# Patient Record
Sex: FEMALE | Race: ASIAN | NOT HISPANIC OR LATINO | URBAN - METROPOLITAN AREA
[De-identification: names, ages, dates, MRNs, and addresses within clinical notes are randomized per-mention and may not be internally consistent; named-entity substitution may affect disease eponyms.]

---

## 2020-09-21 ENCOUNTER — LAB OUTSIDE AN ENCOUNTER (OUTPATIENT)
Dept: URBAN - METROPOLITAN AREA CLINIC 98 | Facility: CLINIC | Age: 30
End: 2020-09-21

## 2020-09-22 LAB
ALBUMIN/GLOBULIN RATIO: 1.3
ALBUMIN: 4.6
ALKALINE PHOSPHATASE: 67
ALT (SGPT): 11
AST (SGOT): 17
BILIRUBIN, DIRECT: 0.2
BILIRUBIN, INDIRECT: 1.1
BILIRUBIN, TOTAL: 1.3
CHOL/HDLC RATIO: 3.9
CHOLESTEROL, TOTAL: 185
GLOBULIN: 3.5
HDL CHOLESTEROL: 48
IMMUNOGLOBULIN A: 388
IMMUNOGLOBULIN G: 2279
IMMUNOGLOBULIN M: 152
LDL-CHOLESTEROL: 120
NON HDL CHOLESTEROL: 137
PROTEIN, TOTAL: 8.1
TRIGLYCERIDES: 71

## 2020-09-29 ENCOUNTER — OFFICE VISIT (OUTPATIENT)
Dept: URBAN - METROPOLITAN AREA TELEHEALTH 2 | Facility: TELEHEALTH | Age: 30
End: 2020-09-29
Payer: COMMERCIAL

## 2020-09-29 DIAGNOSIS — D47.2: ICD-10-CM

## 2020-09-29 DIAGNOSIS — K76.0 FATTY LIVER: ICD-10-CM

## 2020-09-29 DIAGNOSIS — E78.5 DYSLIPIDEMIA: ICD-10-CM

## 2020-09-29 PROCEDURE — 1036F TOBACCO NON-USER: CPT | Performed by: INTERNAL MEDICINE

## 2020-09-29 PROCEDURE — 99213 OFFICE O/P EST LOW 20 MIN: CPT | Performed by: INTERNAL MEDICINE

## 2020-09-29 PROCEDURE — G9903 PT SCRN TBCO ID AS NON USER: HCPCS | Performed by: INTERNAL MEDICINE

## 2020-09-29 PROCEDURE — G8420 CALC BMI NORM PARAMETERS: HCPCS | Performed by: INTERNAL MEDICINE

## 2020-09-29 PROCEDURE — G8427 DOCREV CUR MEDS BY ELIG CLIN: HCPCS | Performed by: INTERNAL MEDICINE

## 2020-09-29 RX ORDER — DROSPIRENONE/ETHINYL ESTRADIOL/LEVOMEFOLATE CALCIUM AND LEVOMEFOLATE CALCIUM 3-0.02(24)
TAKE 1 TABLET BY ORAL ROUTE ONCE DAILY KIT ORAL 1
Qty: 0 | Refills: 0 | Status: ACTIVE | COMMUNITY
Start: 1900-01-01

## 2020-09-29 NOTE — HPI-TODAY'S VISIT:
Admits that she was slacking off her regimen at the beginning of quarantine. now back to normal.  eating a healthy diet.  exercising regularly.  no abdominal pain

## 2021-07-20 ENCOUNTER — TELEPHONE ENCOUNTER (OUTPATIENT)
Dept: URBAN - METROPOLITAN AREA CLINIC 98 | Facility: CLINIC | Age: 31
End: 2021-07-20

## 2021-09-07 ENCOUNTER — OFFICE VISIT (OUTPATIENT)
Dept: URBAN - METROPOLITAN AREA CLINIC 97 | Facility: CLINIC | Age: 31
End: 2021-09-07
Payer: COMMERCIAL

## 2021-09-07 DIAGNOSIS — K76.0 FATTY (CHANGE OF) LIVER: ICD-10-CM

## 2021-09-07 PROCEDURE — 76700 US EXAM ABDOM COMPLETE: CPT | Performed by: INTERNAL MEDICINE

## 2021-09-07 RX ORDER — DROSPIRENONE/ETHINYL ESTRADIOL/LEVOMEFOLATE CALCIUM AND LEVOMEFOLATE CALCIUM 3-0.02(24)
TAKE 1 TABLET BY ORAL ROUTE ONCE DAILY KIT ORAL 1
Qty: 0 | Refills: 0 | Status: ACTIVE | COMMUNITY
Start: 1900-01-01

## 2021-09-09 LAB
A/G RATIO: 1.3
ALBUMIN: 4.5
ALKALINE PHOSPHATASE: 43
ALT (SGPT): 110
AST (SGOT): 62
BASO (ABSOLUTE): 0
BASOS: 0
BILIRUBIN, TOTAL: 0.5
BUN/CREATININE RATIO: 14
BUN: 10
CALCIUM: 9.4
CARBON DIOXIDE, TOTAL: 21
CHLORIDE: 104
CHOLESTEROL, TOTAL: 198
COMMENT:: (no result)
CREATININE: 0.69
EGFR IF AFRICN AM: 135
EGFR IF NONAFRICN AM: 117
EOS (ABSOLUTE): 0
EOS: 1
GLOBULIN, TOTAL: 3.4
GLUCOSE: 86
HDL CHOLESTEROL: 56
HEMATOCRIT: 39.7
HEMATOLOGY COMMENTS:: (no result)
HEMOGLOBIN: 13.1
IGG, SUBCLASS 4: 441
IMMATURE CELLS: (no result)
IMMATURE GRANS (ABS): 0
IMMATURE GRANULOCYTES: 0
IMMUNOGLOBULIN A, QN, SERUM: 313
IMMUNOGLOBULIN G, QN, SERUM: 1983
IMMUNOGLOBULIN M, QN, SERUM: 115
LDL CHOL CALC (NIH): 116
LYMPHS (ABSOLUTE): 2.1
LYMPHS: 36
MCH: 30.4
MCHC: 33
MCV: 92
MONOCYTES(ABSOLUTE): 0.4
MONOCYTES: 6
NEUTROPHILS (ABSOLUTE): 3.3
NEUTROPHILS: 57
NRBC: (no result)
PLATELETS: 336
POTASSIUM: 4.1
PROTEIN, TOTAL: 7.9
RBC: 4.31
RDW: 11.6
SODIUM: 139
TRIGLYCERIDES: 150
VLDL CHOLESTEROL CAL: 26
WBC: 5.8

## 2021-09-14 ENCOUNTER — OFFICE VISIT (OUTPATIENT)
Dept: URBAN - METROPOLITAN AREA CLINIC 97 | Facility: CLINIC | Age: 31
End: 2021-09-14

## 2021-09-16 ENCOUNTER — OFFICE VISIT (OUTPATIENT)
Dept: URBAN - METROPOLITAN AREA TELEHEALTH 2 | Facility: TELEHEALTH | Age: 31
End: 2021-09-16
Payer: COMMERCIAL

## 2021-09-16 DIAGNOSIS — R74.01 TRANSAMINITIS: ICD-10-CM

## 2021-09-16 DIAGNOSIS — D47.2: ICD-10-CM

## 2021-09-16 DIAGNOSIS — K76.0 FATTY LIVER: ICD-10-CM

## 2021-09-16 DIAGNOSIS — E78.5 DYSLIPIDEMIA: ICD-10-CM

## 2021-09-16 PROCEDURE — 99214 OFFICE O/P EST MOD 30 MIN: CPT | Performed by: INTERNAL MEDICINE

## 2021-09-16 RX ORDER — DROSPIRENONE/ETHINYL ESTRADIOL/LEVOMEFOLATE CALCIUM AND LEVOMEFOLATE CALCIUM 3-0.02(24)
TAKE 1 TABLET BY ORAL ROUTE ONCE DAILY KIT ORAL 1
Qty: 0 | Refills: 0 | Status: ACTIVE | COMMUNITY
Start: 1900-01-01

## 2021-09-16 NOTE — HPI-TODAY'S VISIT:
I've been feeling ok.  after last year - did relax diet.  not as strict about her diet and exercise.  did change OCP - now on Reclipsin - she wanted a change bc of hair loss.  nov 2020  no vitamins / supplements except took a vaginal probiotic over the last year [facility] . labs for physical : august alt 71 ast 38  . no alcohol use.  . US 9/7/2021 : fatty liver .

## 2021-09-19 ENCOUNTER — TELEPHONE ENCOUNTER (OUTPATIENT)
Dept: URBAN - METROPOLITAN AREA CLINIC 98 | Facility: CLINIC | Age: 31
End: 2021-09-19

## 2021-09-19 PROBLEM — 370992007 DYSLIPIDEMIA: Status: ACTIVE | Noted: 2020-09-29

## 2021-09-29 ENCOUNTER — LAB OUTSIDE AN ENCOUNTER (OUTPATIENT)
Dept: URBAN - METROPOLITAN AREA TELEHEALTH 2 | Facility: TELEHEALTH | Age: 31
End: 2021-09-29

## 2021-11-01 ENCOUNTER — WEB ENCOUNTER (OUTPATIENT)
Dept: URBAN - METROPOLITAN AREA CLINIC 98 | Facility: CLINIC | Age: 31
End: 2021-11-01

## 2021-11-19 LAB
A/G RATIO: 1.1
ALBUMIN: 4.4
ALKALINE PHOSPHATASE: 48
ALT (SGPT): 79
AST (SGOT): 44
BASO (ABSOLUTE): 0
BASOS: 1
BILIRUBIN, TOTAL: 0.6
BUN/CREATININE RATIO: 13
BUN: 9
CALCIUM: 9.3
CARBON DIOXIDE, TOTAL: 22
CHLORIDE: 101
CREATININE: 0.69
EGFR IF AFRICN AM: 135
EGFR IF NONAFRICN AM: 117
EOS (ABSOLUTE): 0.1
EOS: 1
GGT: 12
GLOBULIN, TOTAL: 3.9
GLUCOSE: 83
HEMATOCRIT: 41.2
HEMATOLOGY COMMENTS:: (no result)
HEMOGLOBIN: 13.9
IMMATURE CELLS: (no result)
IMMATURE GRANS (ABS): 0
IMMATURE GRANULOCYTES: 0
IMMUNOGLOBULIN A, QN, SERUM: 366
IMMUNOGLOBULIN G, QN, SERUM: 2316
IMMUNOGLOBULIN M, QN, SERUM: 127
LYMPHS (ABSOLUTE): 2.2
LYMPHS: 41
MCH: 31.2
MCHC: 33.7
MCV: 93
MONOCYTES(ABSOLUTE): 0.3
MONOCYTES: 6
NEUTROPHILS (ABSOLUTE): 2.8
NEUTROPHILS: 51
NRBC: (no result)
PLATELETS: 369
POTASSIUM: 4.2
PROTEIN, TOTAL: 8.3
RBC: 4.45
RDW: 11.6
SODIUM: 137
WBC: 5.4

## 2021-11-21 ENCOUNTER — TELEPHONE ENCOUNTER (OUTPATIENT)
Dept: URBAN - METROPOLITAN AREA CLINIC 98 | Facility: CLINIC | Age: 31
End: 2021-11-21

## 2022-01-14 ENCOUNTER — LAB OUTSIDE AN ENCOUNTER (OUTPATIENT)
Dept: URBAN - METROPOLITAN AREA CLINIC 98 | Facility: CLINIC | Age: 32
End: 2022-01-14

## 2022-01-14 LAB
ABSOLUTE BASOPHIL COUNT: 0.03
ABSOLUTE EOSINOPHIL COUNT: 0.03
ABSOLUTE IMMATURE GRANULOCYTE  COUNT: 0.02
ABSOLUTE LYMPHOCYTE COUNT: 2.18
ABSOLUTE MONOCYTE COUNT: 0.32
ABSOLUTE NEUTROPHIL COUNT (ANC): 4.06
ABSOLUTE NRBC  COUNT: 0
BASOPHIL AUTO: 0
EOS AUTO: 0
HCT: 43.2
HGB: 14.5
IMMATURE GRANULOCYTES AUTO: 0.3
INR: 1.1
LYMPH AUTO: 33
MCH: 30.3
MCHC: 33.6
MCV: 90.4
MONO AUTO: 5
MPV: 9.9
NEUTRO AUTO: 61
NRBC AUTO: 0
PARTIAL THROMBOPLASTIN TIME: 29.3
PERFORMING LAB: (no result)
PLATELETS: 323
PT: 13.3
RBC: 4.78
RDW: 11.2
WBC: 6.6

## 2022-01-19 ENCOUNTER — TELEPHONE ENCOUNTER (OUTPATIENT)
Dept: URBAN - METROPOLITAN AREA CLINIC 98 | Facility: CLINIC | Age: 32
End: 2022-01-19

## 2022-01-19 ENCOUNTER — WEB ENCOUNTER (OUTPATIENT)
Dept: URBAN - METROPOLITAN AREA CLINIC 98 | Facility: CLINIC | Age: 32
End: 2022-01-19

## 2022-01-19 NOTE — HPI-TODAY'S VISIT:
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   - NO STEATOHEPATITIS.   - NO SIGNIFICANT LOBULAR INFLAMMATION OR PORTAL TRIADITIS.   - NO FIBROSIS.

## 2022-01-20 ENCOUNTER — TELEPHONE ENCOUNTER (OUTPATIENT)
Dept: URBAN - METROPOLITAN AREA CLINIC 98 | Facility: CLINIC | Age: 32
End: 2022-01-20

## 2022-01-20 ENCOUNTER — OFFICE VISIT (OUTPATIENT)
Dept: URBAN - METROPOLITAN AREA TELEHEALTH 2 | Facility: TELEHEALTH | Age: 32
End: 2022-01-20
Payer: COMMERCIAL

## 2022-01-20 DIAGNOSIS — R21 RASH: ICD-10-CM

## 2022-01-20 DIAGNOSIS — K76.0 FATTY LIVER DETERMINED BY BIOPSY: ICD-10-CM

## 2022-01-20 PROCEDURE — 99214 OFFICE O/P EST MOD 30 MIN: CPT | Performed by: INTERNAL MEDICINE

## 2022-01-20 RX ORDER — CEPHALEXIN 250 MG/1
1 CAPSULE CAPSULE ORAL
Qty: 20 | OUTPATIENT
Start: 2022-01-20 | End: 2022-01-25

## 2022-01-20 RX ORDER — DROSPIRENONE/ETHINYL ESTRADIOL/LEVOMEFOLATE CALCIUM AND LEVOMEFOLATE CALCIUM 3-0.02(24)
TAKE 1 TABLET BY ORAL ROUTE ONCE DAILY KIT ORAL 1
Qty: 0 | Refills: 0 | Status: ACTIVE | COMMUNITY
Start: 1900-01-01

## 2022-01-20 NOTE — PHYSICAL EXAM GASTROINTESTINAL
Abdomen , Self exam :soft, nontender, nondistended , no guarding.  2x2 cm erythematous region in epigastric region - non tender to self exam but "full" by her description

## 2022-01-20 NOTE — HPI-TODAY'S VISIT:
Here to follow up from liver biopsy last week.  After biopsy - had rash develop over site, which is a little bit swollen she says.  She tried topical muciprocin and then yesterday IR provided her medrol dose pack - rash has not enlarged  and hasn't changed since onset.  otherwise doing well

## 2022-07-11 LAB
A/G RATIO: 1.1
ALBUMIN: 4.9
ALKALINE PHOSPHATASE: 45
ALT (SGPT): 258
AST (SGOT): 132
BASO (ABSOLUTE): 0
BASOS: 1
BILIRUBIN, TOTAL: 0.8
BUN/CREATININE RATIO: 19
BUN: 15
CALCIUM: 9.6
CARBON DIOXIDE, TOTAL: 21
CHLORIDE: 102
CREATININE: 0.8
EGFR: 101
EOS (ABSOLUTE): 0.1
EOS: 1
GGT: 18
GLOBULIN, TOTAL: 4.3
GLUCOSE: 98
HEMATOCRIT: 44.4
HEMATOLOGY COMMENTS:: (no result)
HEMOGLOBIN: 14.6
IMMATURE CELLS: (no result)
IMMATURE GRANS (ABS): 0
IMMATURE GRANULOCYTES: 0
IMMUNOGLOBULIN A, QN, SERUM: 349
IMMUNOGLOBULIN G, QN, SERUM: 2392
IMMUNOGLOBULIN M, QN, SERUM: 121
LYMPHS (ABSOLUTE): 1.6
LYMPHS: 25
MCH: 30.2
MCHC: 32.9
MCV: 92
MONOCYTES(ABSOLUTE): 0.5
MONOCYTES: 7
NEUTROPHILS (ABSOLUTE): 4.2
NEUTROPHILS: 66
NRBC: (no result)
PLATELETS: 339
POTASSIUM: 4
PROTEIN, TOTAL: 9.2
RBC: 4.83
RDW: 11.8
SODIUM: 136
WBC: 6.4

## 2022-07-15 ENCOUNTER — TELEPHONE ENCOUNTER (OUTPATIENT)
Dept: URBAN - METROPOLITAN AREA CLINIC 98 | Facility: CLINIC | Age: 32
End: 2022-07-15

## 2022-07-20 ENCOUNTER — LAB OUTSIDE AN ENCOUNTER (OUTPATIENT)
Dept: URBAN - METROPOLITAN AREA TELEHEALTH 2 | Facility: TELEHEALTH | Age: 32
End: 2022-07-20

## 2022-07-23 ENCOUNTER — WEB ENCOUNTER (OUTPATIENT)
Dept: URBAN - METROPOLITAN AREA TELEHEALTH 2 | Facility: TELEHEALTH | Age: 32
End: 2022-07-23

## 2022-07-28 ENCOUNTER — OFFICE VISIT (OUTPATIENT)
Dept: URBAN - METROPOLITAN AREA TELEHEALTH 2 | Facility: TELEHEALTH | Age: 32
End: 2022-07-28
Payer: COMMERCIAL

## 2022-07-28 VITALS — HEIGHT: 67 IN | WEIGHT: 125 LBS | BODY MASS INDEX: 19.62 KG/M2

## 2022-07-28 DIAGNOSIS — K76.0 FATTY LIVER DETERMINED BY BIOPSY: ICD-10-CM

## 2022-07-28 PROCEDURE — 99214 OFFICE O/P EST MOD 30 MIN: CPT | Performed by: INTERNAL MEDICINE

## 2022-07-28 RX ORDER — DROSPIRENONE/ETHINYL ESTRADIOL/LEVOMEFOLATE CALCIUM AND LEVOMEFOLATE CALCIUM 3-0.02(24)
TAKE 1 TABLET BY ORAL ROUTE ONCE DAILY KIT ORAL 1
Qty: 0 | Refills: 0 | Status: ACTIVE | COMMUNITY
Start: 1900-01-01

## 2022-07-28 NOTE — PHYSICAL EXAM NEUROLOGIC:
oriented to person, place and time ; short and long term memory intact , oriented to person, place and time ; short and long term memory intact

## 2022-07-28 NOTE — HPI-TODAY'S VISIT:
reports that labs march alt 42 ast 30 - so now with acute rise in transaminitis she says that she moved - so she has been having less exercise : more sedentary  and less restrictions on her diet.  did see Dermatology :  did started spironolactone for hair loss denies other medications or supplements  otherwise feels well no rash

## 2022-07-28 NOTE — PHYSICAL EXAM GASTROINTESTINAL
Abdomen , Self exam :soft, nontender, nondistended , no guarding , Abdomen , Self exam :soft, nontender, nondistended , no guarding

## 2022-07-28 NOTE — PHYSICAL EXAM HENT:
Head,  normocephalic,  atraumatic,  Face,  Face within normal limits,  Ears,  External ears within normal limits,  Nose/Nasopharynx,  External nose  normal appearance,  nares patent,  no nasal discharge,  Mouth and Throat,  Oral cavity appearance normal Lips,  Appearance normal , Head,  normocephalic,  atraumatic,  Face,  Face within normal limits,  Ears,  External ears within normal limits,  Nose/Nasopharynx,  External nose  normal appearance,  nares patent,  no nasal discharge,  Mouth and Throat,  Oral cavity appearance normal Lips,  Appearance normal

## 2022-08-04 ENCOUNTER — WEB ENCOUNTER (OUTPATIENT)
Dept: URBAN - METROPOLITAN AREA CLINIC 98 | Facility: CLINIC | Age: 32
End: 2022-08-04

## 2022-08-10 ENCOUNTER — WEB ENCOUNTER (OUTPATIENT)
Dept: URBAN - METROPOLITAN AREA CLINIC 98 | Facility: CLINIC | Age: 32
End: 2022-08-10

## 2022-08-11 ENCOUNTER — WEB ENCOUNTER (OUTPATIENT)
Dept: URBAN - METROPOLITAN AREA CLINIC 98 | Facility: CLINIC | Age: 32
End: 2022-08-11

## 2022-10-25 ENCOUNTER — WEB ENCOUNTER (OUTPATIENT)
Dept: URBAN - METROPOLITAN AREA CLINIC 98 | Facility: CLINIC | Age: 32
End: 2022-10-25

## 2022-11-05 LAB
ALBUMIN/GLOBULIN RATIO: 1
ALBUMIN: 4.3
ALKALINE PHOSPHATASE: 32
ALT (SGPT): 144
AST (SGOT): 52
BILIRUBIN, DIRECT: 0.2
BILIRUBIN, INDIRECT: 0.6
BILIRUBIN, TOTAL: 0.8
GLOBULIN: 4.1
PROTEIN, TOTAL: 8.4

## 2022-11-06 ENCOUNTER — TELEPHONE ENCOUNTER (OUTPATIENT)
Dept: URBAN - METROPOLITAN AREA CLINIC 98 | Facility: CLINIC | Age: 32
End: 2022-11-06

## 2022-11-06 PROBLEM — 197321007: Status: ACTIVE | Noted: 2022-01-20

## 2022-11-06 PROBLEM — 267440005 MONOCLONAL PARAPROTEINEMIA: Status: ACTIVE | Noted: 2020-09-29

## 2022-11-07 ENCOUNTER — WEB ENCOUNTER (OUTPATIENT)
Dept: URBAN - METROPOLITAN AREA CLINIC 98 | Facility: CLINIC | Age: 32
End: 2022-11-07

## 2022-11-11 ENCOUNTER — LAB OUTSIDE AN ENCOUNTER (OUTPATIENT)
Dept: URBAN - METROPOLITAN AREA CLINIC 98 | Facility: CLINIC | Age: 32
End: 2022-11-11

## 2023-02-06 ENCOUNTER — LAB OUTSIDE AN ENCOUNTER (OUTPATIENT)
Dept: URBAN - METROPOLITAN AREA CLINIC 98 | Facility: CLINIC | Age: 33
End: 2023-02-06

## 2023-03-13 ENCOUNTER — LAB OUTSIDE AN ENCOUNTER (OUTPATIENT)
Dept: URBAN - METROPOLITAN AREA CLINIC 98 | Facility: CLINIC | Age: 33
End: 2023-03-13

## 2023-03-15 LAB
A/G RATIO: 1.2
ALBUMIN: 4.4
ALKALINE PHOSPHATASE: 44
ALT (SGPT): 30
AST (SGOT): 19
BASO (ABSOLUTE): 0
BASOS: 1
BILIRUBIN, TOTAL: 0.6
BUN/CREATININE RATIO: 12
BUN: 8
CALCIUM: 9.5
CARBON DIOXIDE, TOTAL: 22
CHLORIDE: 104
CREATININE: 0.69
EGFR: 119
EOS (ABSOLUTE): 0.1
EOS: 2
GLOBULIN, TOTAL: 3.8
GLUCOSE: 95
HEMATOCRIT: 39.4
HEMATOLOGY COMMENTS:: (no result)
HEMOGLOBIN: 13.1
IMMATURE CELLS: (no result)
IMMATURE GRANS (ABS): 0
IMMATURE GRANULOCYTES: 0
IMMUNOGLOBULIN A, QN, SERUM: 333
IMMUNOGLOBULIN E, TOTAL: 28
IMMUNOGLOBULIN G, QN, SERUM: 2200
IMMUNOGLOBULIN M, QN, SERUM: 132
LYMPHS (ABSOLUTE): 2.1
LYMPHS: 37
MCH: 30.8
MCHC: 33.2
MCV: 93
MONOCYTES(ABSOLUTE): 0.3
MONOCYTES: 6
NEUTROPHILS (ABSOLUTE): 3.1
NEUTROPHILS: 54
NRBC: (no result)
PLATELETS: 362
POTASSIUM: 3.8
PROTEIN, TOTAL: 8.2
RBC: 4.25
RDW: 11.8
SODIUM: 138
WBC: 5.7

## 2023-03-21 ENCOUNTER — WEB ENCOUNTER (OUTPATIENT)
Dept: URBAN - METROPOLITAN AREA CLINIC 98 | Facility: CLINIC | Age: 33
End: 2023-03-21

## 2023-03-24 ENCOUNTER — OFFICE VISIT (OUTPATIENT)
Dept: URBAN - METROPOLITAN AREA TELEHEALTH 2 | Facility: TELEHEALTH | Age: 33
End: 2023-03-24
Payer: COMMERCIAL

## 2023-03-24 ENCOUNTER — TELEPHONE ENCOUNTER (OUTPATIENT)
Dept: URBAN - METROPOLITAN AREA CLINIC 35 | Facility: CLINIC | Age: 33
End: 2023-03-24

## 2023-03-24 DIAGNOSIS — K76.0 FATTY LIVER DETERMINED BY BIOPSY: ICD-10-CM

## 2023-03-24 PROCEDURE — 99213 OFFICE O/P EST LOW 20 MIN: CPT | Performed by: INTERNAL MEDICINE

## 2023-03-24 RX ORDER — DROSPIRENONE/ETHINYL ESTRADIOL/LEVOMEFOLATE CALCIUM AND LEVOMEFOLATE CALCIUM 3-0.02(24)
TAKE 1 TABLET BY ORAL ROUTE ONCE DAILY KIT ORAL 1
Qty: 0 | Refills: 0 | Status: ACTIVE | COMMUNITY
Start: 1900-01-01

## 2023-03-24 NOTE — HPI-TODAY'S VISIT:
in Feb, went through egg retrieval  she thinks she might have PCOS  estrogen was very high  many eggs were retrieved.  15 embryo  . now back to normal.  got menses 15d after retrieval  back on OCP and spironolactone (off 12/24) . was on Coq10, acai velazquez feb - mar also was on levothyroxine

## 2023-06-24 ENCOUNTER — LAB OUTSIDE AN ENCOUNTER (OUTPATIENT)
Dept: URBAN - METROPOLITAN AREA TELEHEALTH 2 | Facility: TELEHEALTH | Age: 33
End: 2023-06-24

## 2023-08-07 ENCOUNTER — WEB ENCOUNTER (OUTPATIENT)
Dept: URBAN - METROPOLITAN AREA CLINIC 98 | Facility: CLINIC | Age: 33
End: 2023-08-07

## 2024-01-30 LAB
A/G RATIO: 1.1
ALBUMIN: 4.2
ALKALINE PHOSPHATASE: 35
ALT (SGPT): 35
AST (SGOT): 24
BASO (ABSOLUTE): 0
BASOS: 1
BILIRUBIN, TOTAL: 0.6
BUN/CREATININE RATIO: 22
BUN: 16
CALCIUM: 9.5
CARBON DIOXIDE, TOTAL: 21
CHLORIDE: 102
CREATININE: 0.74
EGFR: 110
EOS (ABSOLUTE): 0
EOS: 1
GGT: 4
GLOBULIN, TOTAL: 3.9
GLUCOSE: 90
HEMATOCRIT: 38.4
HEMATOLOGY COMMENTS:: (no result)
HEMOGLOBIN: 13
IMMATURE CELLS: (no result)
IMMATURE GRANS (ABS): 0
IMMATURE GRANULOCYTES: 0
LYMPHS (ABSOLUTE): 1.7
LYMPHS: 34
MCH: 31.6
MCHC: 33.9
MCV: 93
MONOCYTES(ABSOLUTE): 0.3
MONOCYTES: 6
NEUTROPHILS (ABSOLUTE): 2.9
NEUTROPHILS: 58
NRBC: (no result)
PLATELETS: 337
POTASSIUM: 4
PROTEIN, TOTAL: 8.1
RBC: 4.12
RDW: 11.9
SODIUM: 137
WBC: 5

## 2024-02-02 ENCOUNTER — OV EP (OUTPATIENT)
Dept: URBAN - METROPOLITAN AREA CLINIC 98 | Facility: CLINIC | Age: 34
End: 2024-02-02
Payer: COMMERCIAL

## 2024-02-02 VITALS
BODY MASS INDEX: 18.87 KG/M2 | HEIGHT: 67 IN | TEMPERATURE: 96.6 F | SYSTOLIC BLOOD PRESSURE: 91 MMHG | WEIGHT: 120.2 LBS | HEART RATE: 81 BPM | DIASTOLIC BLOOD PRESSURE: 68 MMHG

## 2024-02-02 DIAGNOSIS — K76.0 FATTY LIVER DETERMINED BY BIOPSY: ICD-10-CM

## 2024-02-02 DIAGNOSIS — R74.8 ELEVATED LIVER ENZYMES: ICD-10-CM

## 2024-02-02 PROCEDURE — 99213 OFFICE O/P EST LOW 20 MIN: CPT | Performed by: INTERNAL MEDICINE

## 2024-02-02 RX ORDER — LEVOTHYROXINE SODIUM 50 UG/1
1 CAPSULE IN THE MORNING ON AN EMPTY STOMACH CAPSULE ORAL ONCE A DAY
Status: ACTIVE | COMMUNITY

## 2024-02-02 RX ORDER — SPIRONOLACTONE 100 MG/1
1 TABLET TABLET, FILM COATED ORAL ONCE A DAY
Status: ACTIVE | COMMUNITY

## 2024-02-02 RX ORDER — DROSPIRENONE/ETHINYL ESTRADIOL/LEVOMEFOLATE CALCIUM AND LEVOMEFOLATE CALCIUM 3-0.02(24)
TAKE 1 TABLET BY ORAL ROUTE ONCE DAILY KIT ORAL 1
Qty: 0 | Refills: 0 | Status: ACTIVE | COMMUNITY
Start: 1900-01-01

## 2024-02-02 NOTE — HPI-TODAY'S VISIT:
Travelled to [facility]  started working out more regularly -treadmill/ day  no new medications but she is on vitamin D and Biotin from [employer]

## 2024-02-07 ENCOUNTER — LAB (OUTPATIENT)
Dept: URBAN - METROPOLITAN AREA CLINIC 98 | Facility: CLINIC | Age: 34
End: 2024-02-07

## 2025-02-02 ENCOUNTER — LAB OUTSIDE AN ENCOUNTER (OUTPATIENT)
Dept: URBAN - METROPOLITAN AREA CLINIC 98 | Facility: CLINIC | Age: 35
End: 2025-02-02

## 2025-02-11 ENCOUNTER — OFFICE VISIT (OUTPATIENT)
Dept: URBAN - METROPOLITAN AREA CLINIC 98 | Facility: CLINIC | Age: 35
End: 2025-02-11

## 2025-03-21 ENCOUNTER — DASHBOARD ENCOUNTERS (OUTPATIENT)
Age: 35
End: 2025-03-21

## 2025-03-21 ENCOUNTER — OFFICE VISIT (OUTPATIENT)
Dept: URBAN - METROPOLITAN AREA CLINIC 98 | Facility: CLINIC | Age: 35
End: 2025-03-21
Payer: COMMERCIAL

## 2025-03-21 DIAGNOSIS — R74.8 ELEVATED LIVER ENZYMES: ICD-10-CM

## 2025-03-21 DIAGNOSIS — K76.0 FATTY LIVER DETERMINED BY BIOPSY: ICD-10-CM

## 2025-03-21 PROCEDURE — 99213 OFFICE O/P EST LOW 20 MIN: CPT | Performed by: INTERNAL MEDICINE

## 2025-03-21 RX ORDER — DROSPIRENONE/ETHINYL ESTRADIOL/LEVOMEFOLATE CALCIUM AND LEVOMEFOLATE CALCIUM 3-0.02(24)
TAKE 1 TABLET BY ORAL ROUTE ONCE DAILY KIT ORAL 1
Qty: 0 | Refills: 0 | Status: ACTIVE | COMMUNITY
Start: 1900-01-01

## 2025-03-21 RX ORDER — SPIRONOLACTONE 100 MG/1
1 TABLET TABLET, FILM COATED ORAL ONCE A DAY
Status: ACTIVE | COMMUNITY

## 2025-03-21 RX ORDER — LEVOTHYROXINE SODIUM 50 UG/1
1 CAPSULE IN THE MORNING ON AN EMPTY STOMACH CAPSULE ORAL ONCE A DAY
Status: ACTIVE | COMMUNITY